# Patient Record
Sex: MALE | Race: WHITE | URBAN - METROPOLITAN AREA
[De-identification: names, ages, dates, MRNs, and addresses within clinical notes are randomized per-mention and may not be internally consistent; named-entity substitution may affect disease eponyms.]

---

## 2022-02-11 ENCOUNTER — NEW PATIENT (OUTPATIENT)
Dept: URBAN - METROPOLITAN AREA CLINIC 50 | Facility: CLINIC | Age: 62
End: 2022-02-11

## 2022-02-11 DIAGNOSIS — H25.13: ICD-10-CM

## 2022-02-11 DIAGNOSIS — D31.31: ICD-10-CM

## 2022-02-11 DIAGNOSIS — D31.32: ICD-10-CM

## 2022-02-11 DIAGNOSIS — H43.813: ICD-10-CM

## 2022-02-11 PROCEDURE — 92004 COMPRE OPH EXAM NEW PT 1/>: CPT

## 2022-02-11 PROCEDURE — 92134 CPTRZ OPH DX IMG PST SGM RTA: CPT

## 2022-02-11 PROCEDURE — 76514 ECHO EXAM OF EYE THICKNESS: CPT

## 2022-02-11 ASSESSMENT — VISUAL ACUITY
OD_SC: 20/30
OU_CC: 20/20
OS_GLARE: 20/40
OU_SC: J10@14IN
OS_CC: 20/20-2
OD_CC: 20/20
OS_SC: 20/30-1
OU_SC: 20/40
OS_CC: J1+@14IN
OD_SC: J10@14IN
OS_GLARE: 20/25
OS_SC: J16@14IN
OU_CC: J1+@14IN
OD_GLARE: 20/40
OD_CC: J1+@14IN
OD_GLARE: 20/30

## 2022-02-11 ASSESSMENT — PACHYMETRY
OD_CT_UM: 587
OS_CT_UM: 578

## 2022-02-11 ASSESSMENT — TONOMETRY
OD_IOP_MMHG: 15
OD_IOP_MMHG: 18
OS_IOP_MMHG: 20
OS_IOP_MMHG: 18

## 2022-02-11 ASSESSMENT — KERATOMETRY
OS_K1POWER_DIOPTERS: 42.25
OD_K2POWER_DIOPTERS: 42.50
OD_AXISANGLE2_DEGREES: 36
OS_K2POWER_DIOPTERS: 42.25
OS_AXISANGLE_DEGREES: 180
OS_AXISANGLE2_DEGREES: 90
OD_K1POWER_DIOPTERS: 42.00
OD_AXISANGLE_DEGREES: 126

## 2022-02-11 NOTE — PATIENT DISCUSSION
Risk/benefits discussed: Informed the patient that plaque radiation can lead to visual loss from radiation-induced papillopathy, maculopathy, cataract, or glaucoma.

## 2022-05-13 ENCOUNTER — DIAGNOSTICS ONLY (OUTPATIENT)
Dept: URBAN - METROPOLITAN AREA CLINIC 50 | Facility: CLINIC | Age: 62
End: 2022-05-13

## 2022-05-13 DIAGNOSIS — D31.31: ICD-10-CM

## 2022-05-13 DIAGNOSIS — H40.1130: ICD-10-CM

## 2022-05-13 DIAGNOSIS — D31.32: ICD-10-CM

## 2022-05-13 PROCEDURE — 92083 EXTENDED VISUAL FIELD XM: CPT

## 2022-05-13 PROCEDURE — 92133 CPTRZD OPH DX IMG PST SGM ON: CPT

## 2022-05-13 ASSESSMENT — KERATOMETRY
OD_K2POWER_DIOPTERS: 42.50
OS_AXISANGLE2_DEGREES: 90
OS_AXISANGLE_DEGREES: 180
OD_AXISANGLE2_DEGREES: 36
OS_K1POWER_DIOPTERS: 42.25
OD_K1POWER_DIOPTERS: 42.00
OS_K2POWER_DIOPTERS: 42.25
OD_AXISANGLE_DEGREES: 126

## 2022-09-02 ENCOUNTER — ESTABLISHED PATIENT (OUTPATIENT)
Dept: URBAN - METROPOLITAN AREA CLINIC 50 | Facility: CLINIC | Age: 62
End: 2022-09-02

## 2022-09-02 DIAGNOSIS — D31.32: ICD-10-CM

## 2022-09-02 DIAGNOSIS — D31.31: ICD-10-CM

## 2022-09-02 DIAGNOSIS — H43.813: ICD-10-CM

## 2022-09-02 DIAGNOSIS — H40.013: ICD-10-CM

## 2022-09-02 DIAGNOSIS — H25.13: ICD-10-CM

## 2022-09-02 PROCEDURE — 92250 FUNDUS PHOTOGRAPHY W/I&R: CPT

## 2022-09-02 PROCEDURE — 92014 COMPRE OPH EXAM EST PT 1/>: CPT

## 2022-09-02 ASSESSMENT — KERATOMETRY
OD_AXISANGLE2_DEGREES: 36
OS_AXISANGLE_DEGREES: 180
OD_AXISANGLE_DEGREES: 126
OS_K2POWER_DIOPTERS: 42.25
OS_K1POWER_DIOPTERS: 42.25
OS_AXISANGLE2_DEGREES: 90
OD_K2POWER_DIOPTERS: 42.50
OD_K1POWER_DIOPTERS: 42.00

## 2022-09-02 ASSESSMENT — VISUAL ACUITY
OS_GLARE: 20/40
OS_GLARE: 20/30
OS_CC: 20/20-2
OD_GLARE: 20/30
OD_CC: 20/20-1
OU_CC: J1+ @ 14"
OU_CC: 20/20-1
OD_GLARE: 20/40

## 2022-09-02 ASSESSMENT — TONOMETRY
OD_IOP_MMHG: 19
OS_IOP_MMHG: 18
OS_IOP_MMHG: 16
OD_IOP_MMHG: 16

## 2023-11-13 ENCOUNTER — COMPREHENSIVE EXAM (OUTPATIENT)
Dept: URBAN - METROPOLITAN AREA CLINIC 52 | Facility: CLINIC | Age: 63
End: 2023-11-13

## 2023-11-13 DIAGNOSIS — D31.32: ICD-10-CM

## 2023-11-13 DIAGNOSIS — H40.053: ICD-10-CM

## 2023-11-13 DIAGNOSIS — H43.813: ICD-10-CM

## 2023-11-13 DIAGNOSIS — D31.31: ICD-10-CM

## 2023-11-13 DIAGNOSIS — H25.13: ICD-10-CM

## 2023-11-13 PROCEDURE — 92133 CPTRZD OPH DX IMG PST SGM ON: CPT

## 2023-11-13 PROCEDURE — 99214 OFFICE O/P EST MOD 30 MIN: CPT

## 2023-11-13 PROCEDURE — 92083 EXTENDED VISUAL FIELD XM: CPT

## 2023-11-13 ASSESSMENT — TONOMETRY
OD_IOP_MMHG: 24
OD_IOP_MMHG: 21
OS_IOP_MMHG: 23
OS_IOP_MMHG: 25

## 2023-11-13 ASSESSMENT — VISUAL ACUITY
OU_CC: 20/20
OD_CC: 20/20-1
OS_CC: 20/20
OD_GLARE: 20/25
OU_CC: 20/20-1
OS_GLARE: 20/20
OS_GLARE: 20/20-1
OD_GLARE: 20/25-1

## 2024-08-30 ENCOUNTER — EMERGENCY VISIT (OUTPATIENT)
Dept: URBAN - METROPOLITAN AREA CLINIC 50 | Facility: LOCATION | Age: 64
End: 2024-08-30

## 2024-08-30 DIAGNOSIS — H43.811: ICD-10-CM

## 2024-08-30 DIAGNOSIS — D31.31: ICD-10-CM

## 2024-08-30 DIAGNOSIS — D31.32: ICD-10-CM

## 2024-08-30 DIAGNOSIS — H43.812: ICD-10-CM

## 2024-08-30 DIAGNOSIS — H25.13: ICD-10-CM

## 2024-08-30 PROCEDURE — 99214 OFFICE O/P EST MOD 30 MIN: CPT

## 2024-08-30 ASSESSMENT — TONOMETRY
OD_IOP_MMHG: 18
OD_IOP_MMHG: 21
OS_IOP_MMHG: 19
OS_IOP_MMHG: 21

## 2024-08-30 ASSESSMENT — VISUAL ACUITY
OD_CC: 20/30-1
OD_PH: 20/20-1
OS_CC: 20/20-2

## 2024-11-18 ENCOUNTER — COMPREHENSIVE EXAM (OUTPATIENT)
Dept: URBAN - METROPOLITAN AREA CLINIC 52 | Facility: CLINIC | Age: 64
End: 2024-11-18

## 2024-11-18 DIAGNOSIS — D31.32: ICD-10-CM

## 2024-11-18 DIAGNOSIS — H25.13: ICD-10-CM

## 2024-11-18 DIAGNOSIS — D31.31: ICD-10-CM

## 2024-11-18 DIAGNOSIS — H43.813: ICD-10-CM

## 2024-11-18 DIAGNOSIS — H52.4: ICD-10-CM

## 2024-11-18 DIAGNOSIS — H40.053: ICD-10-CM

## 2024-11-18 PROCEDURE — 92133 CPTRZD OPH DX IMG PST SGM ON: CPT

## 2024-11-18 PROCEDURE — 92083 EXTENDED VISUAL FIELD XM: CPT

## 2024-11-18 PROCEDURE — 92015 DETERMINE REFRACTIVE STATE: CPT

## 2024-11-18 PROCEDURE — 99214 OFFICE O/P EST MOD 30 MIN: CPT
